# Patient Record
(demographics unavailable — no encounter records)

---

## 2024-10-15 NOTE — PHYSICAL EXAM
[Mucoid Discharge] : mucoid discharge [NL] : clear to auscultation bilaterally [FreeTextEntry4] : Nasal congestion.

## 2024-10-15 NOTE — HISTORY OF PRESENT ILLNESS
[FreeTextEntry6] : HAD EAR INFECTION 3 WEEKS AGO CAME BACK AGAIN FOR A COLD  WAS DOING OK BUT YESTERDAY STARTED CONGESTION / COUGHING LAST NIGHT VERY HOT TO THE TOUCH /TYLENOL GIVEN   PER MOM DIGGING IN EARS STILL

## 2024-10-15 NOTE — DISCUSSION/SUMMARY
[FreeTextEntry1] : Counseled fully. PREWORK: Reviewed prior notes, reports, and results. Independent historian; parent.  Patient presents with parent for sick visit c/o cough, congestion, and warm to touch per parent.  Advised to continue monitoring temperature and treat PRN with Tylenol or Motrin.  Ensure adequate hydration with Pedialyte or Gatorade. Keep patient comfortable.  Will be contagious until 24hrs fever free.   Symptoms likely due to viral URI. Recommend supportive care including antipyretics, fluids, and nasal saline followed by nasal suction. Return if symptoms worsen or persist. RX Bromphed x3 days.

## 2024-10-18 NOTE — PHYSICAL EXAM
[Clear to Auscultation Bilaterally] : clear to auscultation bilaterally [NL] : warm, clear [Wheezing] : no wheezing [Tachypnea] : no tachypnea [FreeTextEntry1] : mildly ill [FreeTextEntry4] : nasal congestion

## 2024-10-18 NOTE — HISTORY OF PRESENT ILLNESS
[FreeTextEntry6] : PT IS HERE WITH MOM   PT IS HERE FOR COUGH  RUNNY NOSE  NO FEVER   PT WAS HERE MONDAY MOM THINKS SHES GETTING WORSE, MOM DECLINED TESTING ,DUE TO INSURANCE NOT COVERING.  Counseled fully.  Prework: Reviewed prior notes, reports, and results.  Independent historian; parent.

## 2024-10-22 NOTE — PHYSICAL EXAM
[NL] : nonerythematous oropharynx [Clear to Auscultation Bilaterally] : clear to auscultation bilaterally [FreeTextEntry4] : Mom reports greenish mucous from nares. [FreeTextEntry7] : Mom reports barky cough, especially at night.

## 2024-10-22 NOTE — DISCUSSION/SUMMARY
[FreeTextEntry1] : Counseled fully. PREWORK: Reviewed prior notes, reports, and results. Independent historian; parent.  Patient presents with parent for sick visit c/o persistent cough, sleep disturbance, poor appetite. Pt is afebrile.  Symptoms likely due to viral URI. Recommend supportive care including antipyretics, fluids, and nasal saline followed by nasal suction. Return if symptoms worsen or persist. RX Amox BID x10 days.  Recommend using mist from a humidifier. Allow the child to breathe cool air during the night by opening a window or door. Fever can be treated with an over-the-counter medication such as acetaminophen or ibuprofen. Coughing can be treated with warm, clear fluids to loosen mucus on the vocal cords. Warm water, apple juice, or lemonade is safe for children older than four months. Frozen juice popsicles also can be given. Keep the child's head elevated. If the child's stridor does not improve contact health care provider immediately. RX Pred x3 days.

## 2024-10-22 NOTE — HISTORY OF PRESENT ILLNESS
[FreeTextEntry6] : PERSISTENT COUGH WAKING UP A LOT DURING THE NIGHT SOUNDS CROUPY / MOM HEARS MUCUS / BLOODY BOOGERS AND BRIGHT GREEN  ALL DAY AND ALL NIGHT COUGHING NOT EATING WELL HAD APPT ON 10/18  NO FEVERS   Obese

## 2024-10-29 NOTE — HISTORY OF PRESENT ILLNESS
[FreeTextEntry6] : FINISHED PREDNISONE SATURDAY FOR COUGH HELPED COUGH / 12 HRS AFTER MEDS ENDED COUGH STARTED COUGHIING AGAIN  SATURDAY - BREATHING VERY HARSH / GASPING FOR AIR WAS GOING TO GO TO [facility] BUT GOT BETTER/ THEN SUNDAY NIGHT AGAIN WHEEZING NOSE DRIED UP/ NO MORE CONGESTION UP ALL NIGHT 11 PM - 3 AM  DID STEAM SHOWERS THIS MORNING / MOM HEARS WHISTLEING IN BACK WHEN BREATHING  CAN HEAR WHISTLE WHEN PT BREATHING   BUMP IN BELLY BUTTON / PT SEEMS TO BE IRRITATED BY IT PIMPLE ON FACE POPPED UP YESTERDAY  ON AMOXIL TWICE THIS MONTH

## 2024-10-29 NOTE — PHYSICAL EXAM
[NL] : nonerythematous oropharynx [Wheezing] : wheezing [FreeTextEntry7] : Diffuse rhonchi, suspect crackles at RM/LL [de-identified] : Umbilicus with small pustule likely from digital manipulation. Suspect Impetigo.

## 2024-10-29 NOTE — DISCUSSION/SUMMARY
[FreeTextEntry1] : Counseled fully. PREWORK: Reviewed prior notes, reports, and results. Independent historian; parent.  Patient presents with parent for sick visit c/o persistent cough, with wheezing. Pt is afebrile.  Patient was swabbed for RVP with Covid-19/MYCO & Pertussis PCR. Will f/u results in 48 hours. Empirically RX Z-Max x5 days. Trial Mupiricin x 7 days for umbilical pustule.  Rapid RSV: Negative. Provided sample of Ventolin HFA with spacer and mask. Demonstrated use. Rec 2 puffs q2 hours. Inhaler treatment provided in office. Air entry improved with no wheezing. Suspect crackles at R side still. RTO Wednesday.  Symptoms likely due to viral URI. Recommend supportive care including antipyretics, fluids, and nasal saline followed by nasal suction. Return if symptoms worsen or persist.

## 2024-10-29 NOTE — HISTORY OF PRESENT ILLNESS
[FreeTextEntry6] : FINISHED PREDNISONE SATURDAY FOR COUGH HELPED COUGH / 12 HRS AFTER MEDS ENDED COUGH STARTED COUGHIING AGAIN  SATURDAY - BREATHING VERY HARSH / GASPING FOR AIR WAS GOING TO GO TO [facility] BUT GOT BETTER/ THEN SUNDAY NIGHT AGAIN WHEEZING NOSE DRIED UP/ NO MORE CONGESTION UP ALL NIGHT 11 PM - 3 AM  DID STEAM SHOWERS THIS MORNING / MOM HEARS WHISTLEING IN BACK WHEN BREATHING  CAN HEAR WHISTLE WHEN PT BREATHING   BUMP IN BELLY BUTTON / PT SEEMS TO BE IRRITATED BY IT PIMPLE ON FACE POPPED UP YESTERDAY  ON AMOXIL TWICE THIS MONTH

## 2024-10-29 NOTE — PHYSICAL EXAM
[NL] : nonerythematous oropharynx [Wheezing] : wheezing [FreeTextEntry7] : Diffuse rhonchi, suspect crackles at RM/LL [de-identified] : Umbilicus with small pustule likely from digital manipulation. Suspect Impetigo.

## 2024-11-01 NOTE — PHYSICAL EXAM
[Clear Rhinorrhea] : clear rhinorrhea [Clear to Auscultation Bilaterally] : clear to auscultation bilaterally [Transmitted Upper Airway Sounds] : transmitted upper airway sounds [NL] : warm, clear [de-identified] : finger swollen and bruised good rom

## 2024-11-01 NOTE — DISCUSSION/SUMMARY
[FreeTextEntry1] :  Discussed findings. F/U if symptoms persist or worsen  Counseled fully.  Prework: Reviewed prior notes, reports, and results.   Independent historian; parent.

## 2024-12-24 NOTE — PHYSICAL EXAM
[FreeTextEntry1] : GENERAL: alert, cooperative, in NAD SKIN: The skin is intact, warm, pink and dry over the area examined. EYES: Normal conjunctiva, normal eyelids and pupils were equal and round. ENT: normal ears, normal nose and normal lips. CARDIOVASCULAR: brisk capillary refill, but no peripheral edema. RESPIRATORY: The patient is in no apparent respiratory distress. They're taking full deep breaths without use of accessory muscles or evidence of audible wheezes or stridor without the use of a stethoscope. Normal respiratory effort. ABDOMEN: not examined.   Right hand - Moderate edema of the proximal phalanx of the 2nd and 4th digit - There is no ecchymosis,  or erythema noted. - There is no pain elicited with palpation over the metacarpals or phalanges. - Limited range of motion of the 2nd and 4th digits - Full active and passive ROM of the MCP, PIP and DIP joints of all remaining digits with no deformities noted on observation. - Hand is warm and appears well diffused. Good capillary refill +1 in all five digits. - 2+ radial pulse - No nail bed injury

## 2024-12-24 NOTE — HISTORY OF PRESENT ILLNESS
[FreeTextEntry1] : Teresa is a 2 year old female with right index and ring finger fractures sustained in early November. Per report her hand got stuck in a refrigerator cheese drawer. She had pain and swelling and [facility] where radiographs were obtained and per her mother were read as negative. She had persistent swelling and was seen by her pediatrician 1 week prior to evaluation where it was recommended she use splinting and follow up with pediatric orthopedics.   Today, her mother reports persistent swelling of the fingers. She has limited range of motion. Using epsom salt baths to improve her motion. She presents today for initial evaluation of her fingers.

## 2024-12-24 NOTE — REVIEW OF SYSTEMS
[Eczema] : eczema [Joint Swelling] : joint swelling  [Change in Activity] : no change in activity [Fever Above 102] : no fever [Murmur] : no murmur [Wheezing] : no wheezing [Constipation] : no constipation [Limping] : no limping [Joint Pains] : no arthralgias

## 2024-12-24 NOTE — ASSESSMENT
[FreeTextEntry1] : 2 year old female with a right index and ring finger proximal phalanx fracture sustained in mid November  -We discussed the history, physical exam, and all available radiographs at length during today's visit with patient and her parent/guardian who served as an independent historian due to child's age and unreliable nature of history. - Right hand 3 view radiographs were obtained and independently reviewed during today's visit. 2,4 proximal phalanx  fracture with interval healing   -The etiology, pathoanatomy, treatment modalities, and expected natural history of the injury were discussed at length today. -Clinically, she has moderate swelling about the 2nd and 4th digits. She has limited range of motion of these fingers. No pain with palpation. -Recommendation at this time is to work on range of motion of the finger. Sample exercises were demonstrated today - She may weight bear on the right upper extremity as needed. -We will plan to see her back in clinic in approximately 2 weeks for reevaluation to assess her range of motion. If she continues to be stiff at that time a course of OT will be initiated.   All questions and concerns were addressed today. Parent and patient verbalize understanding and agree with plan of care.   I, Tami Anidno, have acted as a scribe and documented the above information for Dr. Daniels   This note was created using Dragon Voice Recognition Software and may have been partially created using [employer] software which was then reviewed and edited to the best of my ability. Sporadic inaccurate translation may have occurred. If there are any questions about content of the note, please contact the office for clarification.

## 2024-12-24 NOTE — END OF VISIT
[FreeTextEntry3] : I, Dwight Daniels MD, I personally performed the services described in the documentation, reviewed the documentation recorded by the scribe in my presence and it accurately and completely records my words and actions

## 2024-12-24 NOTE — REASON FOR VISIT
[Initial Evaluation] : an initial evaluation [Patient] : patient [Mother] : mother [FreeTextEntry1] : Right 2nd and 4th finger proximal phalanx fracture sustained in early November

## 2024-12-24 NOTE — DATA REVIEWED
[de-identified] : Right hand 3 view radiographs were obtained and independently reviewed during today's visit 12/24/24 . 2,4 proximal phalanx  fracture with interval healing

## 2024-12-25 NOTE — DISCUSSION/SUMMARY
[FreeTextEntry1] : Counseled fully. PREWORK: Reviewed prior notes, reports, and results. Independent historian; parent.  Patient presents with parent for sick visit c/o cough and otalgia  TYMPANOMETRY: Type B B/L.  Recommend using mist from a humidifier. Allow the child to breathe cool air during the night by opening a window or door. Fever can be treated with an over-the-counter medication such as acetaminophen or ibuprofen. Coughing can be treated with warm, clear fluids to loosen mucus on the vocal cords. Warm water, apple juice, or lemonade is safe for children older than four months. Frozen juice popsicles also can be given. Keep the child's head elevated. If the child's stridor does not improve contact health care provider immediately. Rx Pred x3 days.

## 2024-12-25 NOTE — HISTORY OF PRESENT ILLNESS
[FreeTextEntry6] : BAD COUGH / SOMEWHAT CROUPY PER MOM  EAR PAIN - BOTH  GOING TO ORTHO FOR FINGERS TOMORROW   HAD WHOOPING COUGH AND RSV BEGINNING OF SEASON

## 2024-12-29 NOTE — HISTORY OF PRESENT ILLNESS
[FreeTextEntry6] : PT IS HERE WITH PARENTS  PT WAS HERE ON MONDAY FOR COUGH  FEVER STARTED YESTERDAY , HIGHEST TEMP 103.5  RED CHEEKS  COUGHING ALOT MORE, KEEPING PT UP AT NIGHT

## 2024-12-29 NOTE — DISCUSSION/SUMMARY
[FreeTextEntry1] : Counseled fully. PREWORK: Reviewed prior notes, reports, and results. Independent historian; parent.  Patient presents with parent for sick visit c/o FEVER, RED CHEEKS, COUGH  Advised to continue monitoring temperature and treat PRN with Tylenol or Motrin.  Ensure adequate hydration with Pedialyte or Gatorade. Keep patient comfortable.  Will be contagious until 24hrs fever free.   Provided sample of Ventolin HFA inhaler MOM REPORTS STILL HAVING SPACER FROM PERTUSSIS EPISODE REC MDI WITH AC TID FOR DURATION OF COUGH   SWABBED RVP IN OFFICE FOR RSV ETC. WILL FOLLOW UP WITH RESULTS IN 48 HOURS RX ZMAX X5 DAYS  RTO TUESDAY FOR LUNG RECHECK

## 2024-12-31 NOTE — HISTORY OF PRESENT ILLNESS
[FreeTextEntry6] : STILL HAS SLIGHT COUGH MORE OF HERSELF DOESNT SLEEP TOO GREAT COUGHING FITS 2-4:30 AM

## 2024-12-31 NOTE — DISCUSSION/SUMMARY
[FreeTextEntry1] : encouraged that symptoms are improving Cool dry air is usually more supportive for children with croup or RSV.  Monitor your child for signs of respiratory distress and if symptoms worsen, go to the ED.   Recommend supportive care including antipyretics, fluids, and nasal saline followed by nasal suction.    independent historian parent  Records reviewed. Fully counseled. All questions and concerns addressed.

## 2025-01-09 NOTE — ASSESSMENT
[FreeTextEntry1] : 2 year old female with a right index and ring finger proximal phalanx fracture sustained in mid November  -We discussed the history, physical exam, and all available radiographs at length during today's visit with patient and her parent/guardian who served as an independent historian due to child's age and unreliable nature of history.  -The etiology, pathoanatomy, treatment modalities, and expected natural history of the injury were discussed at length today. -Clinically, she has moderate swelling about the 2nd and 4th digits. She has limited range of motion of these fingers. No pain with palpation. -Recommendation at this time is to start OT  work on range of motion of the finger. Sample exercises were demonstrated today - She may weight bear on the right upper extremity as needed. -We will plan to see her back in clinic in approximately 3  weeks for reevaluation to assess her range of motion.  at next visit Xray of the right hand  All questions and concerns were addressed today. Parent and patient verbalize understanding and agree with plan of care.   I, Tami Andino, have acted as a scribe and documented the above information for Dr. Daniels   This note was created using Dragon Voice Recognition Software and may have been partially created using [employer] software which was then reviewed and edited to the best of my ability. Sporadic inaccurate translation may have occurred. If there are any questions about content of the note, please contact the office for clarification.

## 2025-01-09 NOTE — REVIEW OF SYSTEMS
[Change in Activity] : no change in activity [Fever Above 102] : no fever [Eczema] : eczema [Murmur] : no murmur [Wheezing] : no wheezing [Constipation] : no constipation [Limping] : no limping [Joint Pains] : no arthralgias [Joint Swelling] : joint swelling

## 2025-01-09 NOTE — HISTORY OF PRESENT ILLNESS
[FreeTextEntry1] : Teresa is a 2 year old female with right index and ring finger fractures sustained in early November. Per report her hand got stuck in a refrigerator cheese drawer. She had pain and swelling and presented [facility] where radiographs were obtained and per her mother were read as negative. She had persistent swelling and was seen by her pediatrician 1 week prior to evaluation where it was recommended she use splinting and follow up with pediatric orthopedics.  Last visit we removed the splint and we encouraged her to start ROM Today, her mother reports persistent swelling of the fingers, but the range of motion improved . Using epsom salt baths to improve her motion. She presents today for follow up of her fingers.

## 2025-01-09 NOTE — REASON FOR VISIT
[Follow Up] : a follow up visit [FreeTextEntry1] : Right 2nd and 4th finger proximal phalanx fracture sustained in early November [Patient] : patient [Mother] : mother

## 2025-01-09 NOTE — DATA REVIEWED
[de-identified] : Right hand 3 view radiographs were obtained and independently reviewed during today's visit 12/24/24 . 2,4 proximal phalanx  fracture with interval healing

## 2025-01-30 NOTE — ASSESSMENT
[FreeTextEntry1] : 2 year old female with a right index and ring finger proximal phalanx fracture sustained on 10/30/2024.  Today's assessment was performed with the assistance of the patient's parent as an independent historian as the patient's history is unreliable. The radiographs obtained today were reviewed with both the parent and patient confirming well aligned healed right index and ring finger proximal phalanx and middle phalanx fractures.  The recommendations time be to start physical therapy as soon as possible and aggressive home exercises to regain her range of motion primarily with flexion.  A new prescription was given today.  She will follow-up in 4 weeks for range of motion check only at that time.  We had a thorough talk in regard to the diagnosis, prognosis and treatment modalities.  All questions and concerns were addressed today. There was a verbal understanding from the parents and patient.   EDSON Lei have acted as a scribe and documented the above information for Dr. Daniels.   This note was generated using Dragon medical dictation software. A reasonable effort has been made for proofreading its contents; however, typos may still remain. If there are any questions or points of clarification needed, please do not hesitate to contact my office.   The above documentation completed by the scribe is an accurate record of both my words and actions.   Dr. Daniels.

## 2025-01-30 NOTE — HISTORY OF PRESENT ILLNESS
[FreeTextEntry1] : Teresa is a 2 year old female with right index and ring finger fractures sustained in early November. Per report her hand got stuck in a refrigerator cheese drawer. She had pain and swelling and presented [facility] where radiographs were obtained and per her mother were read as negative. She had persistent swelling and was seen by her pediatrician 1 week prior to evaluation where it was recommended she use splinting and follow up with pediatric orthopedics.  Last visit we removed the splint and we encouraged her to start ROM Today, her mother reports persistent swelling of the fingers, but the range of motion improved . Using epsom salt baths to improve her motion. She presents today for follow up of her fingers.  Please refer to last note from previous treatment and further details.  Today, Teresa is a 2-year-old girl who sustained her right second and fourth finger proximal phalanx fracture in October.  It has been about 3 months from the date of injury.  She was supposed to start occupational therapy however it has not been started as per the parents due to occupational therapy scheduling.  She continues to have moderate stiffness.  She presents today for pediatric orthopedic follow-up exam and x-rays.

## 2025-01-30 NOTE — DATA REVIEWED
[de-identified] : Right hand 3 view radiographs were obtained and independently reviewed during today's visit 1/30/25 . 2,4 proximal phalanx  fracture with progressive interval healing   Also moderate callus formation noted over the middle phalanx.
TFTs pending, will FU with results

## 2025-01-30 NOTE — PHYSICAL EXAM
[FreeTextEntry1] : Pleasant and cooperative with exam, appropriate for age. Ambulates without evidence of antalgia and limp, good coordination and balance. Awake and alert appropriate for their age.   Skin: No rashes noted.  Eyes: Both conjunctiva, eyelids and pupils are present.  ENT:  Both ears, nose and lips are present. No nasal congestion.  Resp: No cough or wheezing noted.  Right 2nd and 4th fingers: Full extension at the metacarpophalangeal joint, PIP and DIP joints however moderate stiffness at the metacarpophalangeal joint, PIP and DIP joints with flexion.  Positive edema noted over the proximal and middle phalanxes.  Neurologically intact with full sensation to palpation.  4\5 muscle strength.  The metacarpophalangeal joints are stable with stress maneuvers.

## 2025-01-30 NOTE — REASON FOR VISIT
[Follow Up] : a follow up visit [Patient] : patient [Mother] : mother [Father] : father [FreeTextEntry1] : Right 2nd and 4th finger proximal phalanx fracture [id number]

## 2025-02-27 NOTE — ASSESSMENT
[FreeTextEntry1] : 2-year-old female with a right index and ring finger proximal phalanx fracture sustained on 10/30/2024.    Today's visit included obtaining the history from the child and parent, due to the child's age, the child could not be considered a reliable historian, requiring the parent to act as an independent historian. The condition, natural history, and prognosis were explained to the patient and family. The clinical findings and images were reviewed with the family. Based on her exam she will continue physical therapy and occupational therapy at this time to regain her range of motion primarily with flexion.  A new prescription was given today.  Also discussed with parents for follow up with hand specialist for 2nd opinion but parents want to continue PT and OT at this time. No playground activities at this time.  She will follow-up in 4 weeks for range of motion check only at that time.   All questions and concerns were addressed today. There was a verbal understanding from the parents and patient.   I, Queta Grant, have acted as a scribe and documented the above information for Dr. Daniels.

## 2025-02-27 NOTE — HISTORY OF PRESENT ILLNESS
[FreeTextEntry1] : Teresa is a 2-year-old female presents today with her parents for right index and ring finger fractures sustained in early November. Per report her hand got stuck in a refrigerator cheese drawer. She had pain and swelling and presented [facility] where radiographs were obtained and per her mother were read as negative. She had persistent swelling and was seen by her pediatrician 1 week prior to evaluation where it was recommended for splinting and follow up with pediatric orthopedics. Last visit we removed the splint, and we encouraged her to start ROM  She was last seen on 01/30/2025 and recommended for physical therapy. Today, her mother reports that she has persistent swelling of the fingers but swelling and range of motion has improved since last visit. She is participating physical therapy and occupational therapy. Denies any recent fevers, chills or night sweats. Denies any recent trauma or injuries. Denies any tingling sensation or numbness. Here for further orthopedic evaluation.

## 2025-02-27 NOTE — REASON FOR VISIT
[Follow Up] : a follow up visit [Patient] : patient [Parents] : parents [FreeTextEntry1] : Right 2nd and 4th finger proximal phalanx fracture sustained on 10/30/24

## 2025-02-27 NOTE — DATA REVIEWED
[de-identified] : Right hand 3 view radiographs were obtained and independently reviewed during today's visit 1/30/25 . 2,4 proximal phalanx  fracture with progressive interval healing   Also moderate callus formation noted over the middle phalanx.

## 2025-03-12 NOTE — DISCUSSION/SUMMARY
[FreeTextEntry1] : 2-year-old female 4 months out from a P1 index finger fracture and P1 P2 middle finger fracture with significant swelling and stiffness.  I discussed that this amount of swelling and stiffness is abnormal for her age and her injuries however not clinically concerning at this point.  X-rays show interval callus formation and appropriate alignment.  I like her to continue with physical therapy for range of motion however obviously difficult and a 2-year-old.  I would like to monitor her clinically to ensure her range of motion and swelling improves over the next 3 months.  Follow-up in 3 months.

## 2025-03-12 NOTE — PHYSICAL EXAM
[de-identified] : Left hand MF and RF with ++++ swelling, cannot bring finger to palm due to swelling and pain, pulls her hand away with forced flexion by examiner NO malrotation of digits SITLT Full extension to all digits.  [de-identified] :   Xray with 3 views of the left hand was done in office today, 3/12/25 , and reviewed by Dr. Stevens, demonstrated inter al callus formation of index P1 fracture and middle finger P1 and P2 - appropriate alignment

## 2025-03-12 NOTE — HISTORY OF PRESENT ILLNESS
[FreeTextEntry1] : Left MF P1 and P2 fx Left IF P1 fx DOI 11/24  This is a very pleasant 2-year-old female brought in by her parents today for persistent swelling and stiffness after left index and middle finger injury in November from crushing her fingers in a drawer.  She has been treated conservatively and followed with serial x-rays however has had a significant amount of swelling and stiffness.  She is attended physical therapy which parents report helped minimally.  She is also been doing Coban wrapping for finger edema at night.  They report that she is starting to use the hand a little bit more however not as she would prior to the injury.

## 2025-04-03 NOTE — DISCUSSION/SUMMARY
[FreeTextEntry1] : Symptoms likely due to viral URI. Recommend supportive care including antipyretics, fluids, and nasal saline followed by nasal suction. Return if symptoms worsen or persist.   independent historian parent  Records reviewed. Fully counseled. All questions and concerns addressed.  if >1 years old, take 1 tsp of honey mixed with warm water  humidifier and saline drops  OTC cough medicine is only recommended for 6 years and above

## 2025-04-03 NOTE — HISTORY OF PRESENT ILLNESS
[FreeTextEntry6] : PT IS 2YRS OLD, HERE WOTH MOM AND DAD  PT IS HERE FOR SICK VISIT C/O EAR PAIN, FEVER, COUGH AND CONGESTION  HIGHEST TEMP 100.2  MOM SAYS PT IS PULLING AT HER EARS

## 2025-04-03 NOTE — PHYSICAL EXAM
[Erythematous Oropharynx] : erythematous oropharynx [Soft] : soft [NL] : warm, clear [FreeTextEntry3] : scant fluid b/l [de-identified] : thick mucus

## 2025-04-14 NOTE — HISTORY OF PRESENT ILLNESS
[FreeTextEntry6] : NO FEVERS UP ALL NIGHT COUGHING / SOUNDS WET SOMETIMES GASP RANDOMLY  HAD WHOOPING COUGH / AND RSV / CROUP/ IN THE PAST  THREW UP MEDICINE LAST NIGHT / HONEY STAYED DOWN AND HELPED   INHALER BEING USED AS OF LAST NIGHT

## 2025-04-14 NOTE — DISCUSSION/SUMMARY
[FreeTextEntry1] : Counseled fully. PREWORK: Reviewed prior notes, reports, and results. Independent historian; parent.  Patient presents with parent for sick visit c/o COUGH & VOMITING  *DAD REPORTED IMPROVED CONGESTION INITIALLY ON ZYRTEC   Symptoms likely due to viral URI. Recommend supportive care including antipyretics, fluids, and nasal saline followed by nasal suction. Return if symptoms worsen or persist. ADVISED MDI WITH SPACER TID FOR DURATION OF COUGH   Continue trial of Zyrtec daily. *REF AI. SUSPECT RAD SECONDARY TO POS FAMILY HISTORY OF ASTHMA IN SIB   ZENY can have small sips of water or pedialyte. Take 1oz every 10-15 minutes. If tolerated ok to increase the ounces. Do not allow your child to drink an entire cup at once.

## 2025-04-14 NOTE — PHYSICAL EXAM
[Transmitted Upper Airway Sounds] : transmitted upper airway sounds [NL] : no abnormal lymph nodes palpated [FreeTextEntry5] : ALLERGIC SHINERS  [de-identified] : COBBLESTONING

## 2025-04-14 NOTE — PHYSICAL EXAM
[Transmitted Upper Airway Sounds] : transmitted upper airway sounds [NL] : no abnormal lymph nodes palpated [FreeTextEntry5] : ALLERGIC SHINERS  [de-identified] : COBBLESTONING

## 2025-04-16 NOTE — DISCUSSION/SUMMARY
[FreeTextEntry1] : Counseled fully. PREWORK: Reviewed prior notes, reports, and results. Independent historian; parent.  Patient presents with parent for sick visit c/o COUGH & FEVER  *MOM REPORTED MORE EXTENDED FAMILY HISTORY OF ASTHMA  MOM REPORTS UPCOMING AI CONSULT AT 5/22  RAPID FLU: NEGATIVE SWABBED RVP IN OFFICE  WILL FOLLOW UP WITH RESULTS IN 48 HOURS   Advised to continue monitoring temperature and treat PRN with Tylenol or Motrin.  Ensure adequate hydration with Pedialyte or Gatorade. Keep patient comfortable.  Will be contagious until 24hrs fever free.   Symptoms likely due to viral URI. Recommend supportive care including antipyretics, fluids, and nasal saline followed by nasal suction. Return if symptoms worsen or persist. MAY USE ALBUTEROL MDI Q4-6 HOURS  RX AUGMENTIN X10 DAYS  RTO FRIDAY FOR LUNG RECHECK  REF PULMO FOR PARENTS CONCERNS

## 2025-04-16 NOTE — PHYSICAL EXAM
[Playful] : playful [NL] : no abnormal lymph nodes palpated [Acute Distress] : no acute distress [Wheezing] : no wheezing [FreeTextEntry5] : ALLERGIC SHINERS  [FreeTextEntry7] : SUSPECT DECREASED BS AT BASES. COARSE COUGH HEARD.

## 2025-04-16 NOTE — HISTORY OF PRESENT ILLNESS
[FreeTextEntry6] : WAS HERE 4/4 , 4/14, 4/16 COUGHING ALL DAY AND ALL NIGHT NOTHING IS COMING UP FROM COUGH STILL GASPING FOR AIR / ONLY AT NIGHT  HAD WHOOPING COUGH IN THE PAST AND RSV  PER MOM HER BROTHER ( PTS UNCLE ) WHO PASSED AWAY HAD ASTHMA / PT OLDER BROTHER HAS ASTHMA AND MOM HAS SEASONAL ASTHMA  102.8 LAST NIGHT 100.7 THIS MORNING  ZYRTEC EVERY NIGHT   TYLNEOL 9 AM

## 2025-06-17 NOTE — DISCUSSION/SUMMARY
[FreeTextEntry1] : 2-year-old female 7 months out from a P1 index finger fracture and P1 P2 middle finger fracture with significant swelling and stiffness - no improvement from 3 months prior despite therapy with CHT and mom wrapping in coband at night. Cannot make a full fist and I believe developed trigger finger to both digits. Her xrays cont to improve but clinically she remains the same. I would like her to see my partner for clinical opinion, Melody Cannon MD. Will discuss with Dr. Cannon after their appointment.   I have spent 25 minutes of time on the encounter which excludes teaching and/or separately reported services

## 2025-06-17 NOTE — PHYSICAL EXAM
[de-identified] : Left hand MF and RF with ++++ swelling, cannot bring finger to palm due to swelling and pain, pulls her hand away with forced flexion by examiner TTP A1 pulley to MF and RF with catching NO malrotation of digits SITLT Full extension to all digits. [de-identified] : Xray with 3 views of the left hand was done in office today, 6/17/25 , and reviewed by Dr. Stevens, demonstrated improved remodeling at fracture site of index P1 fracture and middle finger P1 and P2 - appropriate alignment.

## 2025-06-17 NOTE — HISTORY OF PRESENT ILLNESS
[FreeTextEntry1] : Left MF P1 and P2 fx Left IF P1 fx DOI 11/24  This is a very pleasant 2-year-old female brought in by her parents today for persistent swelling and stiffness after left index and middle finger injury in November from crushing her fingers in a drawer. She has been treated conservatively and followed with serial x-rays however has had a significant amount of swelling and stiffness. She is attended physical therapy which parents report helped minimally. She is also been doing Coban wrapping for finger edema at night. They report that she is starting to use the hand a little bit more however not as she would prior to the injury.  Interval Hx 6/17/25: Patient arrives for follow up of her left index and middle finger fracture with her parents. Has been attending therapy

## 2025-06-17 NOTE — PHYSICAL EXAM
[de-identified] : Left hand MF and RF with ++++ swelling, cannot bring finger to palm due to swelling and pain, pulls her hand away with forced flexion by examiner TTP A1 pulley to MF and RF with catching NO malrotation of digits SITLT Full extension to all digits. [de-identified] : Xray with 3 views of the left hand was done in office today, 6/17/25 , and reviewed by Dr. Stevens, demonstrated improved remodeling at fracture site of index P1 fracture and middle finger P1 and P2 - appropriate alignment.